# Patient Record
(demographics unavailable — no encounter records)

---

## 2024-11-22 NOTE — HISTORY OF PRESENT ILLNESS
[FreeTextEntry1] : 11/21/24: 42-year-old male with history of DDD and stenosis L4-S1. Last visit over a year ago. He reported to have unresolved severe low back pain radiating to legs, along with numbness of L foot. He had failed conservative management with po medications. rest, therapy and injections. Patient was recommended surgery L4-S1 lumbar fusion with posterior instrumentation to improve pain and weakness. He has been seeing pain management and had 3 JAMES and radiofrequency ablation. He continues to have unresolved back pain and leg weakness despite having multiple injections and RFA. His pain ranges from 6-9 out of 10, worse with sitting and standing for too long. Denies bladder or bowel dysfunction.   01/31/23: Mr. Flores is a 39 y/o, male, here for f/u. He has a history of DDD and stenosis L4-S1. He continues to have unresolved severe low back pain radiating to legs, along with numbness of L foot. He has failed conservative management with po medications. rest, therapy and injections.   11/22/22: Mr. Flores is a 39 y/o, male, here for f/u regarding his lbp and leg pain. Most of his pain begins on the left side of his low back and radiates down both posteriorly and medially to thigh, calves, and at times to foot. When triggered his pain is a 10/10. He has associated numbness of L plantar foot. He has buckling of the left leg. He has muscle spasms on the R side of his low back. He has occasional numbness of b/l inguinal area. Denies urine incontinence. He is currently taking cyclobenzaprine and acetaminophen-codeine #3. He is scheduled to have repeat injection in 3 weeks.   10/06/22: He comes in for followup. He had a flare up of his pain on September 13. Now, the pain is about 9/10 happening constantly. He has to lay down every 1/2 an have to curl up in the fetal position. He has to use a cane to ambulate. The pain is mainly on the left side down the leg to the toes. Now it is starting on the right side. He has pain with sneezing.  His bowel and bladder function is normal.  He takes Flexeril and Tylenol number 3. He can stand about one hour with pain. He can walk about one block but slow. His best position is laying down in a fetal position. He has tingling and numbness of the left leg.He is still seeing Dr. Santana for pain management.   02/09/21: His pain is still severe , he has no bowel or bladder incontinence. He has had spinal injections and PT with no help he has both low back and leg pain, He has not had an EMG and he is scheduled to have a radiofrequency lesion with DR Santana,

## 2024-11-22 NOTE — REVIEW OF SYSTEMS
[As Noted in HPI] : as noted in HPI [Leg Weakness] : leg weakness [Numbness] : numbness [Tingling] : tingling [Abnormal Sensation] : an abnormal sensation [Negative] : Heme/Lymph [de-identified] : low back pain

## 2024-11-22 NOTE — PHYSICAL EXAM
[General Appearance - Alert] : alert [General Appearance - In No Acute Distress] : in no acute distress [General Appearance - Well Nourished] : well nourished [General Appearance - Well Developed] : well developed [Oriented To Time, Place, And Person] : oriented to person, place, and time [Impaired Insight] : insight and judgment were intact [Affect] : the affect was normal [Mood] : the mood was normal [Memory Recent] : recent memory was not impaired [Person] : oriented to person [Place] : oriented to place [Time] : oriented to time [Involuntary Movements] : no involuntary movements were seen [No Muscle Atrophy] : normal bulk in all four extremities [3] : L2 Iliopsoas 3/5 [2+] : Ankle jerk left 2+ [Straight-Leg Raise Test - Left] : straight leg raise of the left leg was positive [Muscle Spasms, Bilateral] : bilateral muscle spasms [Full] : Full [Restricted] : was restricted [Pain] : was painful [SLR] : positive Straight Leg Raise [5] : 5/5 Ankle Plantar Flexion (S1) [4] : 4/5 Ankle Plantar Flexion (S1) [Straight-Leg Raise Test - Right] : straight leg raise  of the right leg was negative [Deformity] : no deformity [Erythema] : no erythema [Swelling] : no swelling

## 2024-11-22 NOTE — ASSESSMENT
[FreeTextEntry1] : Mr. Flores is a 43 y/o, male, with DDD lumbar spine worse L4-L5, L5-S1 with stenosis. Patient with 4/5 weakness of LLE on examination. He continues with unresolved back and leg pain, leg weakness despite extensive conservative therapy. His last MRI was 2 years ago. He needs updated MRI lumbar spine to r/o worsening stenosis and x-ray to assess for instability for surgical planning.

## 2024-11-22 NOTE — REVIEW OF SYSTEMS
[As Noted in HPI] : as noted in HPI [Leg Weakness] : leg weakness [Numbness] : numbness [Tingling] : tingling [Abnormal Sensation] : an abnormal sensation [Negative] : Heme/Lymph [de-identified] : low back pain

## 2024-11-22 NOTE — REVIEW OF SYSTEMS
[As Noted in HPI] : as noted in HPI [Leg Weakness] : leg weakness [Numbness] : numbness [Tingling] : tingling [Abnormal Sensation] : an abnormal sensation [Negative] : Heme/Lymph [de-identified] : low back pain

## 2024-11-22 NOTE — HISTORY OF PRESENT ILLNESS
[FreeTextEntry1] : 11/21/24: 42-year-old male with history of DDD and stenosis L4-S1. Last visit over a year ago. He reported to have unresolved severe low back pain radiating to legs, along with numbness of L foot. He had failed conservative management with po medications. rest, therapy and injections. Patient was recommended surgery L4-S1 lumbar fusion with posterior instrumentation to improve pain and weakness. He has been seeing pain management and had 3 JAMES and radiofrequency ablation. He continues to have unresolved back pain and leg weakness despite having multiple injections and RFA. His pain ranges from 6-9 out of 10, worse with sitting and standing for too long. Denies bladder or bowel dysfunction.   01/31/23: Mr. Flores is a 41 y/o, male, here for f/u. He has a history of DDD and stenosis L4-S1. He continues to have unresolved severe low back pain radiating to legs, along with numbness of L foot. He has failed conservative management with po medications. rest, therapy and injections.   11/22/22: Mr. Flores is a 41 y/o, male, here for f/u regarding his lbp and leg pain. Most of his pain begins on the left side of his low back and radiates down both posteriorly and medially to thigh, calves, and at times to foot. When triggered his pain is a 10/10. He has associated numbness of L plantar foot. He has buckling of the left leg. He has muscle spasms on the R side of his low back. He has occasional numbness of b/l inguinal area. Denies urine incontinence. He is currently taking cyclobenzaprine and acetaminophen-codeine #3. He is scheduled to have repeat injection in 3 weeks.   10/06/22: He comes in for followup. He had a flare up of his pain on September 13. Now, the pain is about 9/10 happening constantly. He has to lay down every 1/2 an have to curl up in the fetal position. He has to use a cane to ambulate. The pain is mainly on the left side down the leg to the toes. Now it is starting on the right side. He has pain with sneezing.  His bowel and bladder function is normal.  He takes Flexeril and Tylenol number 3. He can stand about one hour with pain. He can walk about one block but slow. His best position is laying down in a fetal position. He has tingling and numbness of the left leg.He is still seeing Dr. Santana for pain management.   02/09/21: His pain is still severe , he has no bowel or bladder incontinence. He has had spinal injections and PT with no help he has both low back and leg pain, He has not had an EMG and he is scheduled to have a radiofrequency lesion with DR Santana,

## 2024-11-22 NOTE — RESULTS/DATA
[FreeTextEntry1] : COMPARISON: Correlation made to same day lumbar spine CT.\par  \par  FINDINGS:\par  \par  DISC LEVEL EVALUATION:\par  \par  T12/L1: No spinal canal or foraminal narrowing.\par  L1/L2: Disc bulge with superimposed left subarticular/foraminal disc protrusion with disc osteophyte complex. Mild bilateral facet arthrosis. No canal or neuroforaminal stenosis.\par  L2/L3: Minimal disc bulge. Moderate right and mild left facet arthrosis. No canal or neuroforaminal stenosis.\par  L3/L4: Disc bulge with disc osteophyte complex. Mild bilateral facet arthrosis. Minimal bilateral neural foraminal narrowing. No central canal stenosis.\par  L4/L5: Disc bulge with posterior osseous ridging. Minimal bilateral facet arthrosis. Mild bilateral neural foraminal stenosis. No central canal stenosis.\par  L5/S1: Disc bulge with disc osteophyte complex and superimposed central disc protrusion. Mild bilateral facet arthrosis. Moderate bilateral neural foraminal stenosis. No central canal stenosis.\par  \par  ALIGNMENT: Minimal retrolisthesis of L4 on L5. Straightening of the lumbar spine.\par  BONES: No acute fracture.\par  CORD: The distal cord and conus are normal in signal and morphology, terminating at L1.\par  DISCS: Multilevel degenerative disc disease throughout the lumbar spine, worst at L4-L5 and L5-S1, where there is extensive degenerative endplate edema.\par  \par  VISUALIZED ABDOMEN/PELVIS: Within normal limits\par  SUPERFICIAL SOFT TISSUES: Unremarkable.\par  SACROILIAC JOINT: .Unremarkable.\par  \par  IMPRESSION:\par  \par  Multilevel degenerative changes throughout the lumbar spine, as detailed above, worst at L4-L5 and L5-S1.

## 2024-11-22 NOTE — ASSESSMENT
[FreeTextEntry1] : Mr. Flores is a 41 y/o, male, with DDD lumbar spine worse L4-L5, L5-S1 with stenosis. Patient with 4/5 weakness of LLE on examination. He continues with unresolved back and leg pain, leg weakness despite extensive conservative therapy. His last MRI was 2 years ago. He needs updated MRI lumbar spine to r/o worsening stenosis and x-ray to assess for instability for surgical planning.

## 2024-12-24 NOTE — REASON FOR VISIT
[Home] : at home, [unfilled] , at the time of the visit. [Medical Office: ([facility])___] : at the medical office located in  [Patient] : the patient [Self] : self

## 2024-12-24 NOTE — REASON FOR VISIT
[Home] : at home, [unfilled] , at the time of the visit. [Medical Office: ([facility])___] : at the medical office located in  [Patient] : the patient [Self] : self

## 2024-12-30 NOTE — ASSESSMENT
[FreeTextEntry1] : Mr. Flores is a 43 y/o, male, with DDD lumbar spine worse L4-L5, L5-S1 with stenosis. His symptoms remain the same as last time with severe pain. At this point he is thinking about surgery but he will do it in early march. Her will call for follow up. We review the films in detail and also the xrays.

## 2024-12-30 NOTE — PHYSICAL EXAM
[General Appearance - Alert] : alert [General Appearance - In No Acute Distress] : in no acute distress [General Appearance - Well Nourished] : well nourished [General Appearance - Well Developed] : well developed [Oriented To Time, Place, And Person] : oriented to person, place, and time [Impaired Insight] : insight and judgment were intact [Affect] : the affect was normal [Mood] : the mood was normal [Memory Recent] : recent memory was not impaired [Person] : oriented to person [Place] : oriented to place [Time] : oriented to time [Involuntary Movements] : no involuntary movements were seen [No Muscle Atrophy] : normal bulk in all four extremities [3] : L2 Iliopsoas 3/5 [2+] : Ankle jerk left 2+ [Straight-Leg Raise Test - Left] : straight leg raise of the left leg was positive [Muscle Spasms, Bilateral] : bilateral muscle spasms [Full] : Full [Restricted] : was restricted [Pain] : was painful [SLR] : positive Straight Leg Raise [5] : 5/5 Ankle Plantar Flexion (S1) [4] : 4/5 Ankle Plantar Flexion (S1) [Outer Ear] : the ears and nose were normal in appearance [Hearing Threshold Finger Rub Not [place]] : hearing was normal [Neck Appearance] : the appearance of the neck was normal [] : no respiratory distress [Exaggerated Use Of Accessory Muscles For Inspiration] : no accessory muscle use [Skin Color & Pigmentation] : normal skin color and pigmentation [Straight-Leg Raise Test - Right] : straight leg raise  of the right leg was negative [Deformity] : no deformity [Erythema] : no erythema [Swelling] : no swelling

## 2024-12-30 NOTE — END OF VISIT
[Time Spent: ___ minutes] : I have spent [unfilled] minutes of time on the encounter which excludes teaching and separately reported services. Complex Repair And Rotation Flap Text: The defect edges were debeveled with a #15 scalpel blade.  The primary defect was closed partially with a complex linear closure.  Given the location of the remaining defect, shape of the defect and the proximity to free margins a rotation flap was deemed most appropriate for complete closure of the defect.  Using a sterile surgical marker, an appropriate advancement flap was drawn incorporating the defect and placing the expected incisions within the relaxed skin tension lines where possible.    The area thus outlined was incised deep to adipose tissue with a #15 scalpel blade.  The skin margins were undermined to an appropriate distance in all directions utilizing iris scissors.

## 2024-12-30 NOTE — RESULTS/DATA
[FreeTextEntry1] : MRI lumbar spine 12/19/24:   COMPARISON: MRI dated 11/14/2022.  FINDINGS:  OSSEOUS: Unchanged grade 1 retrolisthesis at L3-L4 and L4-L5. Unchanged cantilever alignment/grade 1 retrolisthesis at L5-S1. Mixed Modic degenerative endplate changes most prominent at L4-L5 and L5/S1. No acute fracture or aggressive osseous neoplasm.  LEVEL BY LEVEL ANALYSIS:  T12-L1: No significant abnormality.  L1-L2: Annular disc osteophyte complex with superimposed left posterior paracentral protrusion contributes to mild spinal canal stenosis with partial effacement of the left lateral recess. Bilateral neural canals are adequate.  L2-L3: No significant abnormality.  L3-L4: No significant abnormality.  L4-L5: Annular disc osteophyte complex and mild to moderate facet arthrosis contribute to mild spinal canal stenosis and mild to moderate right worse than left neural canal stenosis.  L5-S1: Left posterior paracentral predominant disc osteophyte complex and mild to moderate left worse than right facet arthrosis contribute to moderate left worse than right neural canal stenosis.  GENERAL: Conus medullaris tip is anatomic in positioning. No intradural or extradural lesion.  IMPRESSION: 1.  No high-grade lumbar spinal canal or neural canal stenosis. 2.  Discogenic lumbar spondylosis most advanced at L4-L5 and L5-S1.  ADDITIONAL CLINICAL INFORMATION: Low back muscle strain S39.012A  --- End of Report ---   X-ray lumbar spine flexion/extension 12/19/24: IMPRESSION: No compression fractures.  Dynamically stable L3 on L4 and L4 on L5 retrolistheses without spondylolysis defects.  L1-L2 and L3-S1 level variable degree disc space narrowing with disc margin osteophyte formation. Scoliotic spinal curvature.  Unremarkable SI joints.  No discrete lytic or blastic lesions.  Also correlate with findings on concurrently performed L-spine MRI.  --- End of Report ---

## 2024-12-30 NOTE — PHYSICAL EXAM
[General Appearance - Alert] : alert [General Appearance - In No Acute Distress] : in no acute distress [General Appearance - Well Nourished] : well nourished [General Appearance - Well Developed] : well developed [Oriented To Time, Place, And Person] : oriented to person, place, and time [Impaired Insight] : insight and judgment were intact [Affect] : the affect was normal [Mood] : the mood was normal [Memory Recent] : recent memory was not impaired [Person] : oriented to person [Place] : oriented to place [Time] : oriented to time [Involuntary Movements] : no involuntary movements were seen [No Muscle Atrophy] : normal bulk in all four extremities [3] : L2 Iliopsoas 3/5 [2+] : Ankle jerk left 2+ [Straight-Leg Raise Test - Left] : straight leg raise of the left leg was positive [Muscle Spasms, Bilateral] : bilateral muscle spasms [Full] : Full [Restricted] : was restricted [Pain] : was painful [SLR] : positive Straight Leg Raise [5] : 5/5 Ankle Plantar Flexion (S1) [4] : 4/5 Ankle Plantar Flexion (S1) [Outer Ear] : the ears and nose were normal in appearance [Hearing Threshold Finger Rub Not [place]] : hearing was normal [Neck Appearance] : the appearance of the neck was normal [] : no respiratory distress [Exaggerated Use Of Accessory Muscles For Inspiration] : no accessory muscle use [Skin Color & Pigmentation] : normal skin color and pigmentation [Straight-Leg Raise Test - Right] : straight leg raise  of the right leg was negative [Deformity] : no deformity [Erythema] : no erythema [Swelling] : no swelling

## 2024-12-30 NOTE — REVIEW OF SYSTEMS
[As Noted in HPI] : as noted in HPI [Leg Weakness] : leg weakness [Numbness] : numbness [Tingling] : tingling [Abnormal Sensation] : an abnormal sensation [Negative] : Heme/Lymph [de-identified] : low back pain

## 2024-12-30 NOTE — HISTORY OF PRESENT ILLNESS
[FreeTextEntry1] : 12/26/24: He was ordered for updated MRI lumbar spine and x-ray flexion and extension for possible surgical intervention.  42-year-old male with history of DDD and stenosis L4-S1. He has been seeing pain management and had 3 JAMES and radiofrequency ablation. He continued to have unresolved back pain and leg weakness despite having multiple injections and RFA. His pain ranges from 6-9 out of 10, worse with sitting and standing for too long. Denies bladder or bowel dysfunction.   11/21/24: 42-year-old male with history of DDD and stenosis L4-S1. Last visit over a year ago. He reported to have unresolved severe low back pain radiating to legs, along with numbness of L foot. He had failed conservative management with po medications. rest, therapy and injections. Patient was recommended surgery L4-S1 lumbar fusion with posterior instrumentation to improve pain and weakness. He has been seeing pain management and had 3 JAMES and radiofrequency ablation. He continues to have unresolved back pain and leg weakness despite having multiple injections and RFA. His pain ranges from 6-9 out of 10, worse with sitting and standing for too long. Denies bladder or bowel dysfunction.   01/31/23: Mr. Flores is a 39 y/o, male, here for f/u. He has a history of DDD and stenosis L4-S1. He continues to have unresolved severe low back pain radiating to legs, along with numbness of L foot. He has failed conservative management with po medications. rest, therapy and injections.   11/22/22: Mr. Flores is a 39 y/o, male, here for f/u regarding his lbp and leg pain. Most of his pain begins on the left side of his low back and radiates down both posteriorly and medially to thigh, calves, and at times to foot. When triggered his pain is a 10/10. He has associated numbness of L plantar foot. He has buckling of the left leg. He has muscle spasms on the R side of his low back. He has occasional numbness of b/l inguinal area. Denies urine incontinence. He is currently taking cyclobenzaprine and acetaminophen-codeine #3. He is scheduled to have repeat injection in 3 weeks.   10/06/22: He comes in for followup. He had a flare up of his pain on September 13. Now, the pain is about 9/10 happening constantly. He has to lay down every 1/2 an have to curl up in the fetal position. He has to use a cane to ambulate. The pain is mainly on the left side down the leg to the toes. Now it is starting on the right side. He has pain with sneezing.  His bowel and bladder function is normal.  He takes Flexeril and Tylenol number 3. He can stand about one hour with pain. He can walk about one block but slow. His best position is laying down in a fetal position. He has tingling and numbness of the left leg.He is still seeing Dr. Santana for pain management.   02/09/21: His pain is still severe , he has no bowel or bladder incontinence. He has had spinal injections and PT with no help he has both low back and leg pain, He has not had an EMG and he is scheduled to have a radiofrequency lesion with DR Santana,

## 2024-12-30 NOTE — HISTORY OF PRESENT ILLNESS
[FreeTextEntry1] : 12/26/24: He was ordered for updated MRI lumbar spine and x-ray flexion and extension for possible surgical intervention.  42-year-old male with history of DDD and stenosis L4-S1. He has been seeing pain management and had 3 JAMES and radiofrequency ablation. He continued to have unresolved back pain and leg weakness despite having multiple injections and RFA. His pain ranges from 6-9 out of 10, worse with sitting and standing for too long. Denies bladder or bowel dysfunction.   11/21/24: 42-year-old male with history of DDD and stenosis L4-S1. Last visit over a year ago. He reported to have unresolved severe low back pain radiating to legs, along with numbness of L foot. He had failed conservative management with po medications. rest, therapy and injections. Patient was recommended surgery L4-S1 lumbar fusion with posterior instrumentation to improve pain and weakness. He has been seeing pain management and had 3 JAMES and radiofrequency ablation. He continues to have unresolved back pain and leg weakness despite having multiple injections and RFA. His pain ranges from 6-9 out of 10, worse with sitting and standing for too long. Denies bladder or bowel dysfunction.   01/31/23: Mr. Flores is a 41 y/o, male, here for f/u. He has a history of DDD and stenosis L4-S1. He continues to have unresolved severe low back pain radiating to legs, along with numbness of L foot. He has failed conservative management with po medications. rest, therapy and injections.   11/22/22: Mr. Flores is a 41 y/o, male, here for f/u regarding his lbp and leg pain. Most of his pain begins on the left side of his low back and radiates down both posteriorly and medially to thigh, calves, and at times to foot. When triggered his pain is a 10/10. He has associated numbness of L plantar foot. He has buckling of the left leg. He has muscle spasms on the R side of his low back. He has occasional numbness of b/l inguinal area. Denies urine incontinence. He is currently taking cyclobenzaprine and acetaminophen-codeine #3. He is scheduled to have repeat injection in 3 weeks.   10/06/22: He comes in for followup. He had a flare up of his pain on September 13. Now, the pain is about 9/10 happening constantly. He has to lay down every 1/2 an have to curl up in the fetal position. He has to use a cane to ambulate. The pain is mainly on the left side down the leg to the toes. Now it is starting on the right side. He has pain with sneezing.  His bowel and bladder function is normal.  He takes Flexeril and Tylenol number 3. He can stand about one hour with pain. He can walk about one block but slow. His best position is laying down in a fetal position. He has tingling and numbness of the left leg.He is still seeing Dr. Santana for pain management.   02/09/21: His pain is still severe , he has no bowel or bladder incontinence. He has had spinal injections and PT with no help he has both low back and leg pain, He has not had an EMG and he is scheduled to have a radiofrequency lesion with DR Santana,

## 2024-12-30 NOTE — REVIEW OF SYSTEMS
[As Noted in HPI] : as noted in HPI [Leg Weakness] : leg weakness [Numbness] : numbness [Tingling] : tingling [Abnormal Sensation] : an abnormal sensation [Negative] : Heme/Lymph [de-identified] : low back pain